# Patient Record
Sex: MALE | Race: WHITE | NOT HISPANIC OR LATINO
[De-identification: names, ages, dates, MRNs, and addresses within clinical notes are randomized per-mention and may not be internally consistent; named-entity substitution may affect disease eponyms.]

---

## 2018-12-07 PROBLEM — Z00.00 ENCOUNTER FOR PREVENTIVE HEALTH EXAMINATION: Status: ACTIVE | Noted: 2018-12-07

## 2018-12-11 ENCOUNTER — APPOINTMENT (OUTPATIENT)
Dept: ORTHOPEDIC SURGERY | Facility: CLINIC | Age: 73
End: 2018-12-11

## 2019-02-28 ENCOUNTER — INPATIENT (INPATIENT)
Facility: HOSPITAL | Age: 74
LOS: 0 days | Discharge: HOME HEALTH | DRG: 176 | End: 2019-03-01
Attending: INTERNAL MEDICINE | Admitting: INTERNAL MEDICINE
Payer: COMMERCIAL

## 2019-02-28 VITALS
SYSTOLIC BLOOD PRESSURE: 108 MMHG | RESPIRATION RATE: 16 BRPM | DIASTOLIC BLOOD PRESSURE: 64 MMHG | OXYGEN SATURATION: 100 % | HEART RATE: 53 BPM | TEMPERATURE: 98 F

## 2019-02-28 DIAGNOSIS — E83.51 HYPOCALCEMIA: ICD-10-CM

## 2019-02-28 DIAGNOSIS — R63.8 OTHER SYMPTOMS AND SIGNS CONCERNING FOOD AND FLUID INTAKE: ICD-10-CM

## 2019-02-28 DIAGNOSIS — Z29.9 ENCOUNTER FOR PROPHYLACTIC MEASURES, UNSPECIFIED: ICD-10-CM

## 2019-02-28 DIAGNOSIS — I25.10 ATHEROSCLEROTIC HEART DISEASE OF NATIVE CORONARY ARTERY WITHOUT ANGINA PECTORIS: ICD-10-CM

## 2019-02-28 DIAGNOSIS — D61.818 OTHER PANCYTOPENIA: ICD-10-CM

## 2019-02-28 DIAGNOSIS — R00.1 BRADYCARDIA, UNSPECIFIED: ICD-10-CM

## 2019-02-28 DIAGNOSIS — Z98.1 ARTHRODESIS STATUS: Chronic | ICD-10-CM

## 2019-02-28 DIAGNOSIS — C90.00 MULTIPLE MYELOMA NOT HAVING ACHIEVED REMISSION: ICD-10-CM

## 2019-02-28 DIAGNOSIS — I26.99 OTHER PULMONARY EMBOLISM WITHOUT ACUTE COR PULMONALE: ICD-10-CM

## 2019-02-28 DIAGNOSIS — Z91.89 OTHER SPECIFIED PERSONAL RISK FACTORS, NOT ELSEWHERE CLASSIFIED: ICD-10-CM

## 2019-02-28 DIAGNOSIS — R55 SYNCOPE AND COLLAPSE: ICD-10-CM

## 2019-02-28 LAB
ANION GAP SERPL CALC-SCNC: 11 MMOL/L — SIGNIFICANT CHANGE UP (ref 5–17)
APTT BLD: 28.1 SEC — SIGNIFICANT CHANGE UP (ref 27.5–36.3)
APTT BLD: 92.1 SEC — HIGH (ref 27.5–36.3)
BASOPHILS # BLD AUTO: 0.02 K/UL — SIGNIFICANT CHANGE UP (ref 0–0.2)
BASOPHILS NFR BLD AUTO: 0.7 % — SIGNIFICANT CHANGE UP (ref 0–2)
BUN SERPL-MCNC: 12 MG/DL — SIGNIFICANT CHANGE UP (ref 7–23)
CALCIUM SERPL-MCNC: 7.1 MG/DL — LOW (ref 8.4–10.5)
CHLORIDE SERPL-SCNC: 106 MMOL/L — SIGNIFICANT CHANGE UP (ref 96–108)
CLOSURE TME COLL+EPINEP BLD: 95 K/UL — LOW (ref 150–400)
CO2 SERPL-SCNC: 23 MMOL/L — SIGNIFICANT CHANGE UP (ref 22–31)
CREAT SERPL-MCNC: 0.82 MG/DL — SIGNIFICANT CHANGE UP (ref 0.5–1.3)
EOSINOPHIL # BLD AUTO: 0.37 K/UL — SIGNIFICANT CHANGE UP (ref 0–0.5)
EOSINOPHIL NFR BLD AUTO: 12.6 % — HIGH (ref 0–6)
GLUCOSE SERPL-MCNC: 146 MG/DL — HIGH (ref 70–99)
HCT VFR BLD CALC: 30.1 % — LOW (ref 39–50)
HGB BLD-MCNC: 9.5 G/DL — LOW (ref 13–17)
IMM GRANULOCYTES NFR BLD AUTO: 0.3 % — SIGNIFICANT CHANGE UP (ref 0–1.5)
INR BLD: 1.24 — HIGH (ref 0.88–1.16)
LYMPHOCYTES # BLD AUTO: 0.43 K/UL — LOW (ref 1–3.3)
LYMPHOCYTES # BLD AUTO: 14.6 % — SIGNIFICANT CHANGE UP (ref 13–44)
MCHC RBC-ENTMCNC: 31.1 PG — SIGNIFICANT CHANGE UP (ref 27–34)
MCHC RBC-ENTMCNC: 31.6 GM/DL — LOW (ref 32–36)
MCV RBC AUTO: 98.7 FL — SIGNIFICANT CHANGE UP (ref 80–100)
MONOCYTES # BLD AUTO: 0.2 K/UL — SIGNIFICANT CHANGE UP (ref 0–0.9)
MONOCYTES NFR BLD AUTO: 6.8 % — SIGNIFICANT CHANGE UP (ref 2–14)
NEUTROPHILS # BLD AUTO: 1.91 K/UL — SIGNIFICANT CHANGE UP (ref 1.8–7.4)
NEUTROPHILS NFR BLD AUTO: 65 % — SIGNIFICANT CHANGE UP (ref 43–77)
NRBC # BLD: 0 /100 WBCS — SIGNIFICANT CHANGE UP (ref 0–0)
NT-PROBNP SERPL-SCNC: 436 PG/ML — HIGH (ref 0–300)
PLATELET # BLD AUTO: 108 K/UL — LOW (ref 150–400)
POTASSIUM SERPL-MCNC: 4.1 MMOL/L — SIGNIFICANT CHANGE UP (ref 3.5–5.3)
POTASSIUM SERPL-SCNC: 4.1 MMOL/L — SIGNIFICANT CHANGE UP (ref 3.5–5.3)
PROTHROM AB SERPL-ACNC: 14.1 SEC — HIGH (ref 10–12.9)
RBC # BLD: 3.05 M/UL — LOW (ref 4.2–5.8)
RBC # FLD: 17 % — HIGH (ref 10.3–14.5)
SODIUM SERPL-SCNC: 140 MMOL/L — SIGNIFICANT CHANGE UP (ref 135–145)
TROPONIN T SERPL-MCNC: 0.03 NG/ML — HIGH (ref 0–0.01)
TROPONIN T SERPL-MCNC: 0.04 NG/ML — CRITICAL HIGH (ref 0–0.01)
WBC # BLD: 2.94 K/UL — LOW (ref 3.8–10.5)
WBC # FLD AUTO: 2.94 K/UL — LOW (ref 3.8–10.5)

## 2019-02-28 PROCEDURE — 71046 X-RAY EXAM CHEST 2 VIEWS: CPT | Mod: 26

## 2019-02-28 PROCEDURE — 99291 CRITICAL CARE FIRST HOUR: CPT

## 2019-02-28 PROCEDURE — 99223 1ST HOSP IP/OBS HIGH 75: CPT | Mod: GC

## 2019-02-28 PROCEDURE — 99232 SBSQ HOSP IP/OBS MODERATE 35: CPT

## 2019-02-28 PROCEDURE — 93971 EXTREMITY STUDY: CPT | Mod: 26,LT

## 2019-02-28 PROCEDURE — 93306 TTE W/DOPPLER COMPLETE: CPT | Mod: 26

## 2019-02-28 PROCEDURE — 78582 LUNG VENTILAT&PERFUS IMAGING: CPT | Mod: 26

## 2019-02-28 RX ORDER — ACYCLOVIR SODIUM 500 MG
400 VIAL (EA) INTRAVENOUS
Qty: 0 | Refills: 0 | Status: DISCONTINUED | OUTPATIENT
Start: 2019-02-28 | End: 2019-03-01

## 2019-02-28 RX ORDER — ACETAMINOPHEN 500 MG
650 TABLET ORAL EVERY 6 HOURS
Qty: 0 | Refills: 0 | Status: DISCONTINUED | OUTPATIENT
Start: 2019-02-28 | End: 2019-03-01

## 2019-02-28 RX ORDER — SODIUM CHLORIDE 9 MG/ML
1000 INJECTION INTRAMUSCULAR; INTRAVENOUS; SUBCUTANEOUS
Qty: 0 | Refills: 0 | Status: DISCONTINUED | OUTPATIENT
Start: 2019-02-28 | End: 2019-02-28

## 2019-02-28 RX ORDER — HEPARIN SODIUM 5000 [USP'U]/ML
7500 INJECTION INTRAVENOUS; SUBCUTANEOUS EVERY 6 HOURS
Qty: 0 | Refills: 0 | Status: DISCONTINUED | OUTPATIENT
Start: 2019-02-28 | End: 2019-02-28

## 2019-02-28 RX ORDER — SODIUM CHLORIDE 9 MG/ML
1000 INJECTION INTRAMUSCULAR; INTRAVENOUS; SUBCUTANEOUS ONCE
Qty: 0 | Refills: 0 | Status: COMPLETED | OUTPATIENT
Start: 2019-02-28 | End: 2019-02-28

## 2019-02-28 RX ORDER — ATORVASTATIN CALCIUM 80 MG/1
20 TABLET, FILM COATED ORAL AT BEDTIME
Qty: 0 | Refills: 0 | Status: DISCONTINUED | OUTPATIENT
Start: 2019-02-28 | End: 2019-03-01

## 2019-02-28 RX ORDER — HEPARIN SODIUM 5000 [USP'U]/ML
INJECTION INTRAVENOUS; SUBCUTANEOUS
Qty: 25000 | Refills: 0 | Status: DISCONTINUED | OUTPATIENT
Start: 2019-02-28 | End: 2019-03-01

## 2019-02-28 RX ORDER — METOPROLOL TARTRATE 50 MG
12.5 TABLET ORAL DAILY
Qty: 0 | Refills: 0 | Status: DISCONTINUED | OUTPATIENT
Start: 2019-03-01 | End: 2019-03-01

## 2019-02-28 RX ORDER — CALCIUM GLUCONATE 100 MG/ML
1 VIAL (ML) INTRAVENOUS ONCE
Qty: 0 | Refills: 0 | Status: COMPLETED | OUTPATIENT
Start: 2019-02-28 | End: 2019-02-28

## 2019-02-28 RX ORDER — ISOSORBIDE MONONITRATE 60 MG/1
60 TABLET, EXTENDED RELEASE ORAL EVERY 24 HOURS
Qty: 0 | Refills: 0 | Status: DISCONTINUED | OUTPATIENT
Start: 2019-03-01 | End: 2019-03-01

## 2019-02-28 RX ORDER — LENALIDOMIDE 5 MG/1
25 CAPSULE ORAL DAILY
Qty: 0 | Refills: 0 | Status: DISCONTINUED | OUTPATIENT
Start: 2019-03-01 | End: 2019-03-01

## 2019-02-28 RX ORDER — HEPARIN SODIUM 5000 [USP'U]/ML
3500 INJECTION INTRAVENOUS; SUBCUTANEOUS EVERY 6 HOURS
Qty: 0 | Refills: 0 | Status: DISCONTINUED | OUTPATIENT
Start: 2019-02-28 | End: 2019-02-28

## 2019-02-28 RX ORDER — HEPARIN SODIUM 5000 [USP'U]/ML
7500 INJECTION INTRAVENOUS; SUBCUTANEOUS ONCE
Qty: 0 | Refills: 0 | Status: COMPLETED | OUTPATIENT
Start: 2019-02-28 | End: 2019-02-28

## 2019-02-28 RX ORDER — POLYETHYLENE GLYCOL 3350 17 G/17G
17 POWDER, FOR SOLUTION ORAL DAILY
Qty: 0 | Refills: 0 | Status: DISCONTINUED | OUTPATIENT
Start: 2019-02-28 | End: 2019-03-01

## 2019-02-28 RX ORDER — SIMETHICONE 80 MG/1
80 TABLET, CHEWABLE ORAL EVERY 8 HOURS
Qty: 0 | Refills: 0 | Status: DISCONTINUED | OUTPATIENT
Start: 2019-02-28 | End: 2019-03-01

## 2019-02-28 RX ORDER — SENNA PLUS 8.6 MG/1
2 TABLET ORAL AT BEDTIME
Qty: 0 | Refills: 0 | Status: DISCONTINUED | OUTPATIENT
Start: 2019-02-28 | End: 2019-03-01

## 2019-02-28 RX ADMIN — Medication 200 GRAM(S): at 23:29

## 2019-02-28 RX ADMIN — HEPARIN SODIUM 1700 UNIT(S)/HR: 5000 INJECTION INTRAVENOUS; SUBCUTANEOUS at 18:05

## 2019-02-28 RX ADMIN — HEPARIN SODIUM 7500 UNIT(S): 5000 INJECTION INTRAVENOUS; SUBCUTANEOUS at 18:06

## 2019-02-28 RX ADMIN — Medication 400 MILLIGRAM(S): at 23:30

## 2019-02-28 RX ADMIN — ATORVASTATIN CALCIUM 20 MILLIGRAM(S): 80 TABLET, FILM COATED ORAL at 23:30

## 2019-02-28 RX ADMIN — SODIUM CHLORIDE 125 MILLILITER(S): 9 INJECTION INTRAMUSCULAR; INTRAVENOUS; SUBCUTANEOUS at 18:37

## 2019-02-28 RX ADMIN — SODIUM CHLORIDE 2000 MILLILITER(S): 9 INJECTION INTRAMUSCULAR; INTRAVENOUS; SUBCUTANEOUS at 14:48

## 2019-02-28 RX ADMIN — SENNA PLUS 2 TABLET(S): 8.6 TABLET ORAL at 23:30

## 2019-02-28 NOTE — H&P ADULT - NSHPLABSRESULTS_GEN_ALL_CORE
9.5    2.94  )-----------( 108      ( 28 Feb 2019 14:43 )             30.1       LIVER FUNCTIONS - ( 28 Feb 2019 14:43 )  Alb: 3.3 g/dL / Pro: 6.6 g/dL / ALK PHOS: 72 U/L / ALT: 13 U/L / AST: 19 U/L / GGT: x           All imaging reviewed.

## 2019-02-28 NOTE — CONSULT NOTE ADULT - ATTENDING COMMENTS
I have evaluated this 73M with a history of recently diagnosed multiple myeloma on CT/RT (last chemotherapy today), pathologic lumbar spine fracture  s/p vertebroplasty and spinal fusion  two mos ago.  H/O CAD, former tobacco use, vasovagal syncope in the past when he sees blood, and nephrolithiasis presenting from the office of his oncologist after syncope during chemo infusion.   The patient had a ventilation/perfusion scan done that showed one segment of the hand many subsegmental some wedge-shaped peripherally-based defects on the perfusion scan. This would be considered high probability of pulmonary emboli.  I evaluated the patient in the emergency department. The basic issue wall is whether the syncope was related to pulmonary embolism. In this case the pulmonary embolism may be considered massive or submassive.  The patient is lying comfortably on the stretcher. He is not complaining of shortness of breath. His SpO2 at rest on room air is 96%. His heart rate is 58 per minute and is regular. (He is not on beta blockers) he has good entry bilaterally with no wheezing or rhonchi. There is no evidence of jugular venous distention. There is no clinical evidence of right heart failure. He has no pedal edema.  A neck Doppler shows no evidence of deep venous thrombi.  My assessment is that patient had a syncopal episode when he saw blood being drawn from other patients in the infusion facility. He stays that he has had a prior reaction in the past. He describes a typical vasovagal attack it is my assessment that the syncope was not related to pulmonary emboli.  Therefore I do not think that the patient needs to be considered for thrombolytic therapy  I discussed management with the pulmonary and the emergency room I agree with starting intravenous heparin and falling the patient closely I have evaluated this 73M with a history of recently diagnosed multiple myeloma on CT/RT (last chemotherapy today), pathologic lumbar spine fracture  s/p vertebroplasty and spinal fusion  two mos ago.  H/O CAD, former tobacco use, vasovagal syncope in the past when he sees blood, and nephrolithiasis presenting from the office of his oncologist after syncope during chemo infusion.   The patient had a ventilation/perfusion scan done that showed one segmental and many subsegmental some wedge-shaped peripherally-based defects on the perfusion scan. This would be considered high probability for pulmonary emboli.  I evaluated the patient in the emergency department. The basic issue is whether the syncope was related to pulmonary embolism. In that case the pulmonary embolism may be considered massive or submassive.  The patient is lying comfortably on the stretcher. He is not complaining of shortness of breath. His SpO2 at rest on room air is 96%. His heart rate is 58 per minute and is regular. (He is not on beta blockers). He has good entry bilaterally with no wheezing or rhonchi. There is no evidence of jugular venous distention. There is no clinical evidence of right heart failure. He has no pedal edema. MP score of 4  A leg Doppler shows no evidence of deep venous thrombi.  My assessment is that patient had a syncopal episode when he saw blood being drawn from other patients in the infusion facility. He stays that he has had a prior similar reaction in the past. He describes a typical vasovagal attack. It is my assessment that the syncope was not related to pulmonary emboli.  Therefore I do not think that the patient needs to be considered for thrombolytic therapy  I discussed management with the pulmonary and the emergency room I agree with starting intravenous heparin and observing the patient closely

## 2019-02-28 NOTE — H&P ADULT - PROBLEM SELECTOR PROBLEM 2
Pulmonary embolism without acute cor pulmonale, unspecified chronicity, unspecified pulmonary embolism type

## 2019-02-28 NOTE — ED PROVIDER NOTE - PROGRESS NOTE DETAILS
Pt discussed w Dr Reece - pt w occluded lad w collaterals, 75% distal circ occlusionhe agrees w plan and recommends admit on tele overnight if eval neg 2/2 h/o cad; he requests we use the cardiology hospitalist [facility] [place]. Pt discussed w Dr Reece - pt w occluded lad w collaterals, 75% distal circ occlusion; he agrees w plan and recommends admit on tele overnight if eval neg 2/2 h/o cad; he requests we use [place] cardiology hospital[place] [facility]. Pt w + vq per verbal report by Dr Guidry.  Cardiology consulted for echo, pulm consulted and Dr Leonard text messaged to activate pert team.  Per Dr Reece - pt had his spine surgery 12/18-19, 2018 at [facility] [place] [place].  Ortho spine paged to discuss safety of heparin after his spinal surgery. Per ortho, pt safe for ac.  Heparin ordered. Pt discussed w Dr Arteaga ([place] coverage) - pt tba to hospitalist svc and he will see pt tomorrow.  Per cardiology, pt w/o R heart strain on echo.  Repeat trop pending.  Pulm paged to discuss dispo. Pt discussed w Dr Arteaga ([place] coverage) - pt tba to hospitalist svc and he will see pt tomorrow.  Per cardiology, pt w/o R heart strain on echo.  Repeat trop pending.  Pulm paged to discuss dispo - they are coming to eval pt; pt signed out to Dr Zapien pending pulm eval and 2nd trop.

## 2019-02-28 NOTE — ED ADULT TRIAGE NOTE - CHIEF COMPLAINT QUOTE
pt received chemo injection & shortly after had syncopal episode. pt states " I felt dizzy before I passed out. " pt receiving chemo & radiation for spinal ca.

## 2019-02-28 NOTE — H&P ADULT - PROBLEM SELECTOR PLAN 5
-chronic, stable. Denies CP.  -C/w toprol, reduce dose to 12.5 mg qd  -c/w lipitor  -hold ASA while on heparin gtt  -c/w imdur

## 2019-02-28 NOTE — ED PROVIDER NOTE - SHIFT CHANGE DETAILS
sign out from  director with pe on vq scan.  hemodynamically stable with normal bedside echo, mild trop elevation.  pending eval by pulmonary team to eval dispo floor vs tele.  >> seen by pulm, rec admit to floor on heparin.

## 2019-02-28 NOTE — ED PROVIDER NOTE - CARE PLAN
Principal Discharge DX:	Pulmonary embolus Principal Discharge DX:	Syncope  Secondary Diagnosis:	Pulmonary embolus

## 2019-02-28 NOTE — ED PROVIDER NOTE - CLINICAL SUMMARY MEDICAL DECISION MAKING FREE TEXT BOX
Pt w h/o vasovagal syncope many yrs ago c/o syncope today; hpi v suggestive of vagal response to injection and watching others get blood draws, however, pt w h/o severe cad and also noted to have lle edema w/o pain/ttp but concerning for dvt in setting of ca; if + dvt, ? pe as etiology of syncope.  Pt also w long qtc - ? arrhythmia.  Plan labs, lle doppler, v/q 2/2 h/o multiple myeloma, trop, cardiac monitor, orthostatics, ivf, reassess.

## 2019-02-28 NOTE — H&P ADULT - HISTORY OF PRESENT ILLNESS
Pt is a 73M with a history of recently diagnosed multiple myeloma on CT/RT (last chemotherapy today), pathologic lumbar spine fx s/p vertebroplasty and spinal fusion (?) two mos ago, CAD, former tobacco use, vasovagal syncope, and nephrolithiasis presenting from the office of his oncologist after syncopizing during CT infusion. He says he recalls feeling "queezy" for about one minute before passing out. He felt lightheadedness and warm. He believes it was observing others having their blood drawn that made him queezy. Denies CP, palp, sob, HA. He was told he passed out for a few seconds and when he came to he was back to baseline. No shaking, incontinence, or tongue biting. No personal hx of seizure. He was not confused when he woke up. Denies numbness/tingling, focal weakness, blurry/double vision, hearing loss. Denies recent illness including F/C, cough, congestion, sore throat. Reports a history of vasovagal syncope twenty years ago. His daughter has episodes of vasovagal syncope at least three times per year when something makes her very anxious. No family hx early or sudden cardiac death. Denies heavy etoh and drug use. Denies history of DVT/PE. Denies recent travel. Does admit to being pretty sedentary since back surgery. Reports being diagnosed with CAD prior to back operation with diagnostic cath showing total LAD occlusion supplied by collaterals and 75% L Cx occlusion. No stents placed. Denies CHF. Denies orthopnea. Had not noticed his LLE to be slightly edematous until ED provider noticed it today. Denies calf pain. Does not know the name of his CT. Last RT was on Monday. He is aware of his pancytopenia, which his oncologist is following. Denies bleeding and dark stool.    In the ED, HR 53, otherwise VSS. Labs notable for WBC 2.9 (12% eos), hgb 9.5 (MCV 98), plt 108k. Trop 0.04, . EKG sinus bradycardia with prolonged QTc (522) and isolated TWI in aVL and V1. VQ scan showed defect in RML territory as well as at least 1-2 other small, bilateral defects consistent with PE. LLE U/S revealed no DVT. He was given 1L NS and hep IVP -> gtt.

## 2019-02-28 NOTE — H&P ADULT - ATTENDING COMMENTS
patient seen and examined    reviewed data including:  labs, available radiological reports/ studies, ekg    agree w/ PE findings as above, except LLExt appeared slightly larger than RLEXT     1. syncope: resolved, occuring during CTX session; appears vasovagal, however found to  have acute PE; follow up ECHO, agree w/ reducing toprol dose given bradycardia.   2. acute PE: on heparin gtt overnight, transition to PO NOAC in AM; followup pulm recs.   3. MM: on CTX, monitor CBC/ BMP ; followup heme/ onc recs  4. monitor QTc, replete lytes prn     rest of plan as above

## 2019-02-28 NOTE — H&P ADULT - PROBLEM SELECTOR PLAN 10
Contacted on Admission: (Y/N) --> Name & Phone #:   2) Date of Contact with PCP:   3) PCP Contacted at Discharge: (Y/N, N/A): N/A  4) Summary of Handoff Given to PCP: N/A  5) Post-Discharge Appointment Date and Location: [facility] Contacted on Admission: (N) --> Name & Phone #: Dr. Nieves   2) Date of Contact with PCP:   3) PCP Contacted at Discharge: (Y/N, N/A): N/A  4) Summary of Handoff Given to PCP: N/A  5) Post-Discharge Appointment Date and Location: [facility]

## 2019-02-28 NOTE — CONSULT NOTE ADULT - PROBLEM SELECTOR RECOMMENDATION 9
-B/L acute pulmonary emboli found on VQ scan. Patients has risk factors in that he has active malignancy.   -LE dopplers negative for DVT. Stat echo done by cardiology at bedside does not show any evidence of right heart strain.   -Cardiac enzymes minimally elevated with absence of RH strain on echo.  -There is no clinical indication for catheter directed thrombolysis.   -PESI score 112, 4-11% estimated 30 day mortality based on this, patient will require close follow up as an out patient.     Recommend:  -Would recommend heparin gtt for now, would transition to an oral agent tomorrow if patient otherwise stable. Lovenox is ideal in malignancy however this patient has vasovagal response to injections so this would not likely work for him also the patient is at risk for renal insufficiency with his MM.   -Oral edoxaban is an option and recent studies suggest that rivaroxaban and apixiban work well for thromboembolic disease in malignancy. The patient follows closely with an oncologist at [facility] who may have a particular preference.   -Would recommend the patient ambulate tomorrow with supervision and have O2 sat monitored to ensure patient does not require supplemental O2 with exertion and that hemodynamics remain stable.   -Patient can follow up with Dr. Joe in the pulmonary clinic upon discharge.   -Please call back with any further questions.

## 2019-02-28 NOTE — H&P ADULT - PROBLEM SELECTOR PLAN 2
-VQ scan c/w multiple b/l PE  -LLE doppler neg. f/u RLE doppler  -c/w heparin drip. Transition to PO A/C tomorrow as per pulm  -monitor O2 sat with ambulation  -PTT Q6 -VQ scan c/w multiple b/l PE. unclear if acute as denies CP, palpitations, SOB. syncope more likely 2/2 vasovagal syncope. Possibly provoked by recent hospitalization vs active malignancy.   -LLE doppler neg. f/u RLE doppler  -c/w heparin drip. Transition to PO A/C tomorrow as per pulm  -monitor O2 sat with ambulation  -PTT Q6

## 2019-02-28 NOTE — H&P ADULT - PROBLEM SELECTOR PLAN 1
-likely vasovagal syncope. reports prodrome of lightheadedness after observing blood draw and getting   -Less likely 2/2 to PE, which shows no e/o heart strain on echo (per cardiology).   -EKG sinus bradycardia without heart block. Isolated TWI. Trop peaked at 0.04. HR now in the 60s. Reduce Toprol XL.  -no e/o seizure  -no FND. CTH deferred.  -orthostatic negative -likely vasovagal syncope. reports prodrome of lightheadedness after observing blood draw and getting CT injection  -Less likely 2/2 to PE, which shows no e/o heart strain on echo (per cardiology).   -EKG sinus bradycardia (50s) without heart block. Isolated TWI. Trop peaked at 0.04. HR now in the 60s. Reduce Toprol XL. repeat EKG in am (QTc prolonged to 525 but stable).   -no e/o seizure  -no FND. CTH deferred.  -orthostatic negative

## 2019-02-28 NOTE — H&P ADULT - NSHPSOCIALHISTORY_GEN_ALL_CORE
tobacco- 20 pack year history quitting 32 years ago  etoh- social  drugs- denies  ambulates without assistance at baseline

## 2019-02-28 NOTE — H&P ADULT - PROBLEM SELECTOR PLAN 3
-resolved. Likely 2/2 toprol XL. Reduce Toprol to 12.5 mg qd  -no acute ischemic changes on EKG  -TSH -resolved. Likely 2/2 toprol XL. Reduce Toprol to 12.5 mg qd  -no acute ischemic changes on EKG  -TSH    #prolonged QTc  -Stable on repeat EKG. 522 -> 525. Repeat in am.   -Mg WNL. Repleted Ca.

## 2019-02-28 NOTE — H&P ADULT - NSHPPHYSICALEXAM_GEN_ALL_CORE
General:  NAD, nontoxic appearing, WDWN, elderly, slightly pale  HENT:  EOMI, PERRL.  No sinus tenderness.  oropharynx WNL.  MM slightly dry  Neck:  Trachea midline.  No JVD, LAD, or thyromegaly.  Heart:  S1S2 no M/R/G, rrr  Lungs:  CTAB no wheezing, rhonchi or rales.  No accessory muscle use.  No respiratory distress.  Abdomen:  NABS.  soft, nontender, mildly distended.  no guarding.  no ascites.  no organomegaly.  Vascular:  Peripheral pulses palpable  Extremities:  1+ pitting edema LLE below the knee. Multiple erythematous (nonpurulent) excorations on ankles b/l.  Back:  No CVA tenderness. Healing linear back scar with mild erythema but no warmth or purulence.  Neuro:  AOx3, no facial asymmetry, nonfocal, no slurred speech. Strength 5/5 throughout. Sensation to light touch intact throughout. CN II-XII grossly intact. Gait deferred.  Skin:  excoriations on ankles

## 2019-02-28 NOTE — ED ADULT NURSE NOTE - OBJECTIVE STATEMENT
73y male AOx4, c/o of syncope after receiving chemo tx at 12:00pm. Patient states "I passed out for 15 seconds after I received my chemotherapy medication." Patient states to be dizzy, no fall, no NVD, NAD, no cp, no sob, no fever, no aches. Patient stated to have finished spinal surgery 3 weeks ago. Pt denies hitting head, no blood thinners. Pt denies any symptoms at this time.

## 2019-02-28 NOTE — H&P ADULT - ASSESSMENT
73M with a history of recently diagnosed multiple myeloma on CT/RT (last chemotherapy today), pathologic lumbar spine fx s/p vertebroplasty and spinal fusion (?) two mos ago, CAD, former tobacco use, vasovagal syncope, and nephrolithiasis admitted with syncope and acute PE.

## 2019-02-28 NOTE — ED ADULT NURSE NOTE - CHPI ED NUR SYMPTOMS NEG
no congestion/no diaphoresis/no dizziness/no chills/no nausea/no chest pain/no fever/no shortness of breath/no vomiting

## 2019-02-28 NOTE — H&P ADULT - PROBLEM SELECTOR PLAN 4
-Likely 2/2 CT  -f/u iron studies, B12, folate, HIV, HCV  -Trend CBC  -T&S  -no e/o bleeding -Likely 2/2 CT  -f/u iron studies, B12, folate, HIV, HCV  -Trend CBC  -T&S  -no e/o bleeding    #prolonged QTc  -Stable on repeat EKG. 522 -> 525. Repeat in am.   -Mg WNL. Repleted Ca.

## 2019-02-28 NOTE — H&P ADULT - FAMILY HISTORY
Child  Still living? Unknown  Family history of syncope, Age at diagnosis: Age Unknown Child  Still living? Unknown  Family history of syncope, Age at diagnosis: Age Unknown     Mother  Still living? Unknown  Family history of breast cancer, Age at diagnosis: Age Unknown     Father  Still living? Unknown  Family history of death of natural cause, Age at diagnosis: Age Unknown

## 2019-03-01 ENCOUNTER — TRANSCRIPTION ENCOUNTER (OUTPATIENT)
Age: 74
End: 2019-03-01

## 2019-03-01 VITALS
HEART RATE: 64 BPM | OXYGEN SATURATION: 99 % | RESPIRATION RATE: 16 BRPM | SYSTOLIC BLOOD PRESSURE: 143 MMHG | DIASTOLIC BLOOD PRESSURE: 63 MMHG | TEMPERATURE: 97 F

## 2019-03-01 LAB
ANION GAP SERPL CALC-SCNC: 8 MMOL/L — SIGNIFICANT CHANGE UP (ref 5–17)
APPEARANCE UR: CLEAR — SIGNIFICANT CHANGE UP
APTT BLD: 87.5 SEC — HIGH (ref 27.5–36.3)
BILIRUB UR-MCNC: NEGATIVE — SIGNIFICANT CHANGE UP
BLD GP AB SCN SERPL QL: NEGATIVE — SIGNIFICANT CHANGE UP
BUN SERPL-MCNC: 10 MG/DL — SIGNIFICANT CHANGE UP (ref 7–23)
CALCIUM SERPL-MCNC: 6.6 MG/DL — LOW (ref 8.4–10.5)
CALCIUM SERPL-MCNC: 6.9 MG/DL — LOW (ref 8.4–10.5)
CHLORIDE SERPL-SCNC: 108 MMOL/L — SIGNIFICANT CHANGE UP (ref 96–108)
CO2 SERPL-SCNC: 24 MMOL/L — SIGNIFICANT CHANGE UP (ref 22–31)
COLOR SPEC: YELLOW — SIGNIFICANT CHANGE UP
CREAT SERPL-MCNC: 0.8 MG/DL — SIGNIFICANT CHANGE UP (ref 0.5–1.3)
DIFF PNL FLD: NEGATIVE — SIGNIFICANT CHANGE UP
FERRITIN SERPL-MCNC: 167 NG/ML — SIGNIFICANT CHANGE UP (ref 30–400)
GLUCOSE SERPL-MCNC: 82 MG/DL — SIGNIFICANT CHANGE UP (ref 70–99)
GLUCOSE UR QL: NEGATIVE — SIGNIFICANT CHANGE UP
HCT VFR BLD CALC: 29.2 % — LOW (ref 39–50)
HCV AB S/CO SERPL IA: 0.04 S/CO — SIGNIFICANT CHANGE UP
HCV AB SERPL-IMP: SIGNIFICANT CHANGE UP
HGB BLD-MCNC: 9 G/DL — LOW (ref 13–17)
HIV 1+2 AB+HIV1 P24 AG SERPL QL IA: SIGNIFICANT CHANGE UP
INR BLD: 1.24 — HIGH (ref 0.88–1.16)
IRON SATN MFR SERPL: 15 % — LOW (ref 16–55)
IRON SATN MFR SERPL: 30 UG/DL — LOW (ref 45–165)
KETONES UR-MCNC: NEGATIVE — SIGNIFICANT CHANGE UP
LEUKOCYTE ESTERASE UR-ACNC: NEGATIVE — SIGNIFICANT CHANGE UP
MAGNESIUM SERPL-MCNC: 2.1 MG/DL — SIGNIFICANT CHANGE UP (ref 1.6–2.6)
MCHC RBC-ENTMCNC: 30.2 PG — SIGNIFICANT CHANGE UP (ref 27–34)
MCHC RBC-ENTMCNC: 30.8 GM/DL — LOW (ref 32–36)
MCV RBC AUTO: 98 FL — SIGNIFICANT CHANGE UP (ref 80–100)
NITRITE UR-MCNC: NEGATIVE — SIGNIFICANT CHANGE UP
NRBC # BLD: 0 /100 WBCS — SIGNIFICANT CHANGE UP (ref 0–0)
PCP SPEC-MCNC: SIGNIFICANT CHANGE UP
PH UR: 6 — SIGNIFICANT CHANGE UP (ref 5–8)
PLATELET # BLD AUTO: 111 K/UL — LOW (ref 150–400)
POTASSIUM SERPL-MCNC: 3.5 MMOL/L — SIGNIFICANT CHANGE UP (ref 3.5–5.3)
POTASSIUM SERPL-SCNC: 3.5 MMOL/L — SIGNIFICANT CHANGE UP (ref 3.5–5.3)
PROT UR-MCNC: NEGATIVE MG/DL — SIGNIFICANT CHANGE UP
PROTHROM AB SERPL-ACNC: 14.1 SEC — HIGH (ref 10–12.9)
PTH-INTACT FLD-MCNC: 161 PG/ML — HIGH (ref 15–65)
RBC # BLD: 2.98 M/UL — LOW (ref 4.2–5.8)
RBC # FLD: 17.2 % — HIGH (ref 10.3–14.5)
RH IG SCN BLD-IMP: POSITIVE — SIGNIFICANT CHANGE UP
SODIUM SERPL-SCNC: 140 MMOL/L — SIGNIFICANT CHANGE UP (ref 135–145)
SP GR SPEC: 1.01 — SIGNIFICANT CHANGE UP (ref 1–1.03)
TIBC SERPL-MCNC: 202 UG/DL — LOW (ref 220–430)
TRANSFERRIN SERPL-MCNC: 174 MG/DL — LOW (ref 200–360)
UIBC SERPL-MCNC: 172 UG/DL — SIGNIFICANT CHANGE UP (ref 110–370)
UROBILINOGEN FLD QL: 0.2 E.U./DL — SIGNIFICANT CHANGE UP
WBC # BLD: 2.55 K/UL — LOW (ref 3.8–10.5)
WBC # FLD AUTO: 2.55 K/UL — LOW (ref 3.8–10.5)

## 2019-03-01 PROCEDURE — 85027 COMPLETE CBC AUTOMATED: CPT

## 2019-03-01 PROCEDURE — 86803 HEPATITIS C AB TEST: CPT

## 2019-03-01 PROCEDURE — 78582 LUNG VENTILAT&PERFUS IMAGING: CPT

## 2019-03-01 PROCEDURE — 96374 THER/PROPH/DIAG INJ IV PUSH: CPT

## 2019-03-01 PROCEDURE — 83550 IRON BINDING TEST: CPT

## 2019-03-01 PROCEDURE — 36415 COLL VENOUS BLD VENIPUNCTURE: CPT

## 2019-03-01 PROCEDURE — 83735 ASSAY OF MAGNESIUM: CPT

## 2019-03-01 PROCEDURE — 83970 ASSAY OF PARATHORMONE: CPT

## 2019-03-01 PROCEDURE — 80076 HEPATIC FUNCTION PANEL: CPT

## 2019-03-01 PROCEDURE — 84466 ASSAY OF TRANSFERRIN: CPT

## 2019-03-01 PROCEDURE — 99285 EMERGENCY DEPT VISIT HI MDM: CPT | Mod: 25

## 2019-03-01 PROCEDURE — 93306 TTE W/DOPPLER COMPLETE: CPT

## 2019-03-01 PROCEDURE — 87389 HIV-1 AG W/HIV-1&-2 AB AG IA: CPT

## 2019-03-01 PROCEDURE — 85379 FIBRIN DEGRADATION QUANT: CPT

## 2019-03-01 PROCEDURE — 99239 HOSP IP/OBS DSCHRG MGMT >30: CPT

## 2019-03-01 PROCEDURE — A9540: CPT

## 2019-03-01 PROCEDURE — 82728 ASSAY OF FERRITIN: CPT

## 2019-03-01 PROCEDURE — 85025 COMPLETE CBC W/AUTO DIFF WBC: CPT

## 2019-03-01 PROCEDURE — 83540 ASSAY OF IRON: CPT

## 2019-03-01 PROCEDURE — 86850 RBC ANTIBODY SCREEN: CPT

## 2019-03-01 PROCEDURE — 82607 VITAMIN B-12: CPT

## 2019-03-01 PROCEDURE — 85610 PROTHROMBIN TIME: CPT

## 2019-03-01 PROCEDURE — 85045 AUTOMATED RETICULOCYTE COUNT: CPT

## 2019-03-01 PROCEDURE — 86900 BLOOD TYPING SEROLOGIC ABO: CPT

## 2019-03-01 PROCEDURE — 83880 ASSAY OF NATRIURETIC PEPTIDE: CPT

## 2019-03-01 PROCEDURE — 84443 ASSAY THYROID STIM HORMONE: CPT

## 2019-03-01 PROCEDURE — 93971 EXTREMITY STUDY: CPT

## 2019-03-01 PROCEDURE — 82310 ASSAY OF CALCIUM: CPT

## 2019-03-01 PROCEDURE — 82962 GLUCOSE BLOOD TEST: CPT

## 2019-03-01 PROCEDURE — 81003 URINALYSIS AUTO W/O SCOPE: CPT

## 2019-03-01 PROCEDURE — 71046 X-RAY EXAM CHEST 2 VIEWS: CPT

## 2019-03-01 PROCEDURE — 86901 BLOOD TYPING SEROLOGIC RH(D): CPT

## 2019-03-01 PROCEDURE — A9567: CPT

## 2019-03-01 PROCEDURE — 82746 ASSAY OF FOLIC ACID SERUM: CPT

## 2019-03-01 PROCEDURE — 84484 ASSAY OF TROPONIN QUANT: CPT

## 2019-03-01 PROCEDURE — 85730 THROMBOPLASTIN TIME PARTIAL: CPT

## 2019-03-01 PROCEDURE — 80307 DRUG TEST PRSMV CHEM ANLYZR: CPT

## 2019-03-01 PROCEDURE — 80048 BASIC METABOLIC PNL TOTAL CA: CPT

## 2019-03-01 RX ORDER — SENNA PLUS 8.6 MG/1
17 TABLET ORAL
Qty: 0 | Refills: 0 | COMMUNITY

## 2019-03-01 RX ORDER — POTASSIUM CHLORIDE 20 MEQ
40 PACKET (EA) ORAL ONCE
Qty: 0 | Refills: 0 | Status: COMPLETED | OUTPATIENT
Start: 2019-03-01 | End: 2019-03-01

## 2019-03-01 RX ORDER — ATORVASTATIN CALCIUM 80 MG/1
1 TABLET, FILM COATED ORAL
Qty: 0 | Refills: 0 | COMMUNITY

## 2019-03-01 RX ORDER — APIXABAN 2.5 MG/1
10 TABLET, FILM COATED ORAL EVERY 12 HOURS
Qty: 0 | Refills: 0 | Status: DISCONTINUED | OUTPATIENT
Start: 2019-03-01 | End: 2019-03-01

## 2019-03-01 RX ORDER — SIMETHICONE 80 MG/1
1 TABLET, CHEWABLE ORAL
Qty: 0 | Refills: 0 | COMMUNITY

## 2019-03-01 RX ORDER — ISOSORBIDE MONONITRATE 60 MG/1
60 TABLET, EXTENDED RELEASE ORAL EVERY 24 HOURS
Qty: 0 | Refills: 0 | Status: DISCONTINUED | OUTPATIENT
Start: 2019-03-01 | End: 2019-03-01

## 2019-03-01 RX ORDER — METOPROLOL TARTRATE 50 MG
1 TABLET ORAL
Qty: 0 | Refills: 0 | COMMUNITY

## 2019-03-01 RX ORDER — APIXABAN 2.5 MG/1
1 TABLET, FILM COATED ORAL
Qty: 60 | Refills: 1 | OUTPATIENT
Start: 2019-03-01 | End: 2019-04-29

## 2019-03-01 RX ORDER — ASPIRIN/CALCIUM CARB/MAGNESIUM 324 MG
1 TABLET ORAL
Qty: 0 | Refills: 0 | COMMUNITY

## 2019-03-01 RX ORDER — OXYCODONE HYDROCHLORIDE 5 MG/1
1 TABLET ORAL
Qty: 0 | Refills: 0 | COMMUNITY

## 2019-03-01 RX ORDER — GABAPENTIN 400 MG/1
0 CAPSULE ORAL
Qty: 0 | Refills: 0 | COMMUNITY

## 2019-03-01 RX ORDER — ACYCLOVIR SODIUM 500 MG
1 VIAL (EA) INTRAVENOUS
Qty: 0 | Refills: 0 | COMMUNITY

## 2019-03-01 RX ORDER — POLYETHYLENE GLYCOL 3350 17 G/17G
1 POWDER, FOR SOLUTION ORAL
Qty: 0 | Refills: 0 | COMMUNITY

## 2019-03-01 RX ORDER — APIXABAN 2.5 MG/1
2 TABLET, FILM COATED ORAL
Qty: 28 | Refills: 0 | OUTPATIENT
Start: 2019-03-01 | End: 2019-03-07

## 2019-03-01 RX ORDER — MAGNESIUM SULFATE 500 MG/ML
1 VIAL (ML) INJECTION ONCE
Qty: 0 | Refills: 0 | Status: COMPLETED | OUTPATIENT
Start: 2019-03-01 | End: 2019-03-01

## 2019-03-01 RX ORDER — ISOSORBIDE MONONITRATE 60 MG/1
1 TABLET, EXTENDED RELEASE ORAL
Qty: 0 | Refills: 0 | COMMUNITY

## 2019-03-01 RX ORDER — LENALIDOMIDE 5 MG/1
1 CAPSULE ORAL
Qty: 0 | Refills: 0 | COMMUNITY

## 2019-03-01 RX ADMIN — Medication 100 GRAM(S): at 03:01

## 2019-03-01 RX ADMIN — Medication 40 MILLIEQUIVALENT(S): at 08:11

## 2019-03-01 RX ADMIN — Medication 400 MILLIGRAM(S): at 06:44

## 2019-03-01 RX ADMIN — ISOSORBIDE MONONITRATE 60 MILLIGRAM(S): 60 TABLET, EXTENDED RELEASE ORAL at 06:44

## 2019-03-01 NOTE — DISCHARGE NOTE NURSING/CASE MANAGEMENT/SOCIAL WORK - NSDCDPATPORTLINK_GEN_ALL_CORE
You can access the [facility] Patient Portal, offered by [facility], by registering with the following website: [email or web address]

## 2019-03-01 NOTE — DISCHARGE NOTE PROVIDER - NSDCCPCAREPLAN_GEN_ALL_CORE_FT
PRINCIPAL DISCHARGE DIAGNOSIS  Problem: Pulmonary embolus  Assessment and Plan of Treatment:       SECONDARY DISCHARGE DIAGNOSES  Problem: Multiple myeloma not having achieved remission  Assessment and Plan of Treatment: Multiple myeloma not having achieved remission PRINCIPAL DISCHARGE DIAGNOSIS  Problem: Pulmonary embolus  Assessment and Plan of Treatment: You were admitted into the hospital for evaluation for syncope. You were found to have a pulmonary embolism on a special imaging on V/Q scan. You were started on heparin drip to help dissolve any clots in your lung. You will be started on eliquis 10mg two times a day for 7 days then 5mg two times a day thereafter. You are stable for discharge.        SECONDARY DISCHARGE DIAGNOSES  Problem: Multiple myeloma not having achieved remission  Assessment and Plan of Treatment: Please follow up with Dr. Ruiz for further treatment of your multiple myeloma

## 2019-03-01 NOTE — DISCHARGE NOTE PROVIDER - CARE PROVIDER_API CALL
Lj Nieves)  Hematology; Internal Medicine; Oncology  [address], [address]  [place] [address]  Phone: [phone]  Fax: [phone]  Follow Up Time:

## 2019-03-01 NOTE — DISCHARGE NOTE PROVIDER - NSDCHC_MEDRECSTATUS_GEN_ALL_CORE
Admission Reconciliation is Completed  Discharge Reconciliation is Not Complete Admission Reconciliation is Not Complete  Discharge Reconciliation is Not Complete Admission Reconciliation is Completed  Discharge Reconciliation is Completed

## 2019-03-01 NOTE — DISCHARGE NOTE PROVIDER - HOSPITAL COURSE
73M with recently diagnosed multiple myeloma on CT/RT (last chemotherapy yesterday), pathologic lumbar spine fx s/p vertebroplasty two months ago, CAD, previous vasovagal episodes admitted for syncope during chemotherapy treatment. Patient felt queasy, lightheaded and warm a minute prior to passing out, which lasted for a few seconds while seated. Returned to baseline without postictal state, no focal neurological deficits, history of seizures or seizure like activity. Denied any palpitations, recent illnesses, chest pain or calf pain/edema. Currently pancytopenic due to treatments but denied any fevers, bleeding or dark stools. Patient felt this episode was related to his observing blood draws. Had previous episodes of vasovagal syncope that he felt were similar, occurring during medical procedures.        In ED HR was 53 without tachypnea, VSS, hgb 9.5, ANC 1911. ED physician noted increased edema in left leg, ordered V/Q scan which showed defect in RML and 1-2 other small, b/l defects consistent with PE. LLE U/S revealed no DVT, echo showed no right heart strain, troponins and BNP showed no acute cardiac ischemia. Started on IV heparin. EKG showed sinus bradycardia with same on repeat. Decreased toprolol to 12.5mg. On floors, patient was hemodynamically stable with normal vital sign  and continued to deny chest pain, SOB, calf pain, fevers or palpitations. aPTT was at therapeutic levels, had mild normocytic anemia, iron workup done with no transfusion required. Seen by pulmonology and will be followed by them in outpatient with Dr. Joe in pulm clinic. Also seen by heme/onc and recommended transition to Eliquis 10 BID for 7 days, then continue 5 mg BID. Patient will be discharged back to oncologist for continued CT/RT treatment. 73M with recently diagnosed multiple myeloma on CT/RT (last chemotherapy yesterday), pathologic lumbar spine fx s/p vertebroplasty two months ago, CAD, previous vasovagal episodes admitted for syncope during chemotherapy treatment. Patient felt queasy, lightheaded and warm a minute prior to passing out, which lasted for a few seconds while seated. Returned to baseline without postictal state, no focal neurological deficits, history of seizures or seizure like activity. Denied any palpitations, recent illnesses, chest pain or calf pain/edema. Currently pancytopenic due to treatments but denied any fevers, bleeding or dark stools. Patient felt this episode was related to his observing blood draws. Had previous episodes of vasovagal syncope that he felt were similar, occurring during medical procedures.    In ED HR was 53 without tachypnea, VSS, hgb 9.5, ANC 1911. ED physician noted increased edema in left leg, ordered V/Q scan which showed defect in RML and 1-2 other small, b/l defects consistent with PE. LLE U/S revealed no DVT, echo showed no right heart strain, troponins and BNP showed no acute cardiac ischemia. Started on IV heparin. EKG showed sinus bradycardia with same on repeat. Decreased toprolol to 12.5mg. On floors, patient was hemodynamically stable with normal vital sign  and continued to deny chest pain, SOB, calf pain, fevers or palpitations. aPTT was at therapeutic levels, had mild normocytic anemia, iron workup done with no transfusion required. Seen by pulmonology and will be followed by them in outpatient with Dr. Joe in pulm clinic. Also seen by heme/onc and recommended transition to Eliquis 10 BID for 7 days, then continue 5 mg BID. Patient will be discharged back to oncologist Dr. Nieves for continued CT/RT treatment.

## 2019-03-01 NOTE — CONSULT NOTE ADULT - SUBJECTIVE AND OBJECTIVE BOX
PERT team consult note      Patient is a 73y old  Male who presents with a chief complaint of syncope, PE.      HPI:  	Pt is a 73M with a history of recently diagnosed multiple myeloma on CT/RT (last chemotherapy today), pathologic lumbar spine fx s/p vertebroplasty and spinal fusion (?) two mos ago, CAD, former tobacco use, vasovagal syncope, and nephrolithiasis presenting from the office of his oncologist after syncopizing during chemo infusion. The patient states that he felt nauseous before the episode as well as lightheadedness and feeling flushed. He believes it was observing others having their blood drawn started off his symptoms. Denies CP, palp, sob, HA. He was told he passed out for a few seconds and when he came to he was back to baseline. No shaking, incontinence, or tongue biting. No personal hx of seizure. He was not confused when he woke up. Denies numbness/tingling, focal weakness, blurry/double vision, hearing loss. Denies recent illness including F/C, cough, congestion, sore throat. Reports a history of vasovagal syncope twenty years ago. Upon presentation to the ED the pt was found to be hemodynamically stable with, satting well on room air, and not in any acute distress, but given hx of malignancy and this recent episode of syncope a VQ scan was done to rule out PE (pt has renal insufficiency from MM) which came back positive for multiple acute PEs b/l. The PERT team was called to evaluate this patient for the need for catheter directed thrombolysis given hx of syncope, new PEs, and mild elevation in troponin and borderline elevation of BNP. The patient denied any SOB, chest pain, palpitations, nausea, vomiting, diarrhea, or abdominal pain and had no acute complaints otherwise.     ROS:  A 14 point ROS was reviewed with the patient and was negative except for what is mentioned above.        PAST MEDICAL & SURGICAL HISTORY:  CAD (coronary artery disease)  Multiple myeloma  H/O spinal fusion      FAMILY HISTORY:  Family history of syncope (Child)      SOCIAL HISTORY:  Smoking Status: Non smoker  Pack Years:    MEDICATIONS:  Pulmonary:    Antimicrobials:  acyclovir   Oral Tab/Cap 400 milliGRAM(s) Oral two times a day    Anticoagulants:  heparin  Infusion.  Unit(s)/Hr IV Continuous <Continuous>    Onc:    GI/:  polyethylene glycol 3350 17 Gram(s) Oral daily  senna 2 Tablet(s) Oral at bedtime  simethicone 80 milliGRAM(s) Chew every 8 hours PRN    Endocrine:  atorvastatin 20 milliGRAM(s) Oral at bedtime    Cardiac:    Other Medications:  acetaminophen   Tablet .. 650 milliGRAM(s) Oral every 6 hours PRN  atorvastatin 20 milliGRAM(s) Oral at bedtime      Allergies    No Known Allergies    Intolerances        Vital Signs Last 24 Hrs  T(C): 36.8 (28 Feb 2019 21:07), Max: 36.8 (28 Feb 2019 21:07)  T(F): 98.2 (28 Feb 2019 21:07), Max: 98.2 (28 Feb 2019 21:07)  HR: 62 (28 Feb 2019 21:07) (53 - 64)  BP: 147/70 (28 Feb 2019 21:07) (108/64 - 151/68)  BP(mean): --  RR: 18 (28 Feb 2019 21:07) (16 - 20)  SpO2: 97% (28 Feb 2019 21:07) (97% - 100%)    General: NAD, AAO x3  HEENT: No icterus,. Moist mucous membranes  Neck: mildJVD noted. Supple, no meningismus  Cardio: S1, S2 noted, RRR. No murmurs, rubs or gallops  Resp: Clear to auscultation b/l. No adventitious sounds  Abdo: Soft, NT, bowel sounds present. No organomegaly  Extremities: No edema noted. Pulses present b/l  Neuro: AAO x3, grossly normal motor strength.  Lymphnodes: no lymphadenopathy identified.  Skin: Dry, no rashes    LABS:      CBC Full  -  ( 28 Feb 2019 14:43 )  WBC Count : 2.94 K/uL  Hemoglobin : 9.5 g/dL  Hematocrit : 30.1 %  Platelet Count - Automated : 108 K/uL  Mean Cell Volume : 98.7 fl  Mean Cell Hemoglobin : 31.1 pg  Mean Cell Hemoglobin Concentration : 31.6 gm/dL  Auto Neutrophil # : 1.91 K/uL  Auto Lymphocyte # : 0.43 K/uL  Auto Monocyte # : 0.20 K/uL  Auto Eosinophil # : 0.37 K/uL  Auto Basophil # : 0.02 K/uL  Auto Neutrophil % : 65.0 %  Auto Lymphocyte % : 14.6 %  Auto Monocyte % : 6.8 %  Auto Eosinophil % : 12.6 %  Auto Basophil % : 0.7 %    02-28    140  |  106  |  12  ----------------------------<  146<H>  4.1   |  23  |  0.82    Ca    7.1<L>      28 Feb 2019 14:43    TPro  6.6  /  Alb  3.3  /  TBili  0.2  /  DBili  <0.2  /  AST  19  /  ALT  13  /  AlkPhos  72  02-28    PT/INR - ( 28 Feb 2019 14:43 )   PT: 14.1 sec;   INR: 1.24          PTT - ( 28 Feb 2019 14:43 )  PTT:28.1 sec                  RADIOLOGY & ADDITIONAL STUDIES (The following images were personally reviewed):  Chest Xray  VQ scan
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 Bone marrow biopsy showed 70% monoclonal kappa plasma cells. IgG 5156, kappa light chain 1098, kappa/lambda ratio of 159. Started on RVD and Xgeva in Dec of 2018. S/p 4 cycles with last dose given on 2/28/19. Yesterday in office had syncopal episode. Pt sent to ED for further eval. On work up noted to have a possible PE on V/Q scan. Echo did not show right heart strain. Heparin started. Today pt feels well and has no major complaints. Ambulating freely at the time of my visit. Denies lightheadedness, chest pain, sob, palpitations, abd pain. US of lower ext negative for DVT.     PMHx:  MM- Dx and treatment as above  Spinal stenosis s/p spinal fustion and RT to lumbar spine.     Social Hx:  no smoking- 20p yrs- quit 30 yrs ago, social EtOH, ambulates without assistance.    Family Hx:  Not pertinent at this time    Meds:  Please see chart    Allergies:  NKDA    Physical exam:  Vital Signs Last 24 Hrs  T(C): 36.3 (01 Mar 2019 08:55), Max: 36.8 (28 Feb 2019 21:07)  T(F): 97.3 (01 Mar 2019 08:55), Max: 98.2 (28 Feb 2019 21:07)  HR: 64 (01 Mar 2019 08:55) (53 - 66)  BP: 143/63 (01 Mar 2019 08:55) (108/64 - 151/68)  BP(mean): --  RR: 16 (01 Mar 2019 08:55) (16 - 20)  SpO2: 99% (01 Mar 2019 08:55) (97% - 100%)    NAD, AAOx3, ambulating   Sclera clear, no conjunctival pallor  No oral lesions  Clear to ausculation bl  Reg rate  +bs, soft, non tender, no distention, no organomegaly  + pulses, equal, no edema    Labs:  Anemia- Hgb 9.0, Cr normal, iron panel with low sat but ferritin normal.   D-dimer high.    V/Q scan:  Possible PE.

## 2019-03-01 NOTE — CONSULT NOTE ADULT - ASSESSMENT
This is a 74 y/o man with a hx of MM who is currently receiving chemo/RT who presented to  after what appears to have been an episode of vasovagal syncope at his oncologist office and was subsequently found to bilateral acute PEs on VQ scan.
72 yo M with MM. Admitted after syncopal episode in office. Diagnosed with possible PE on V/Q scan.    1) MM/ plasmacytoma: Dx and treatment so far as per H&P. Follows with Dr. Nieves.   2) Anemia- likely related to MM. No clear iron def on iron panel. Will Cont to monitor in outpt setting. Would recommend stool occult testing now that pt will need anticoagulation.   3) PE- Positive V/Q scan. No right heart strain on ECHO in ED. Still awaiting official read. On heparin now. May transition to Eliquis 10mg bid for 7 days and then 5mg bid thereafter.   4) Pt doing well. May potentially go home today from heme perspective.

## 2019-03-05 DIAGNOSIS — B00.9 HERPESVIRAL INFECTION, UNSPECIFIED: ICD-10-CM

## 2019-03-05 DIAGNOSIS — Z98.890 OTHER SPECIFIED POSTPROCEDURAL STATES: ICD-10-CM

## 2019-03-05 DIAGNOSIS — K59.00 CONSTIPATION, UNSPECIFIED: ICD-10-CM

## 2019-03-05 DIAGNOSIS — E78.5 HYPERLIPIDEMIA, UNSPECIFIED: ICD-10-CM

## 2019-03-05 DIAGNOSIS — Z98.1 ARTHRODESIS STATUS: ICD-10-CM

## 2019-03-05 DIAGNOSIS — Z87.311 PERSONAL HISTORY OF (HEALED) OTHER PATHOLOGICAL FRACTURE: ICD-10-CM

## 2019-03-05 DIAGNOSIS — R00.1 BRADYCARDIA, UNSPECIFIED: ICD-10-CM

## 2019-03-05 DIAGNOSIS — Z79.82 LONG TERM (CURRENT) USE OF ASPIRIN: ICD-10-CM

## 2019-03-05 DIAGNOSIS — R55 SYNCOPE AND COLLAPSE: ICD-10-CM
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
## 2019-03-05 DIAGNOSIS — I45.81 LONG QT SYNDROME: ICD-10-CM

## 2019-03-05 DIAGNOSIS — Z80.3 FAMILY HISTORY OF MALIGNANT NEOPLASM OF BREAST: ICD-10-CM

## 2019-03-05 DIAGNOSIS — Z92.3 PERSONAL HISTORY OF IRRADIATION: ICD-10-CM

## 2019-03-05 DIAGNOSIS — D64.81 ANEMIA DUE TO ANTINEOPLASTIC CHEMOTHERAPY: ICD-10-CM

## 2019-03-05 DIAGNOSIS — D61.818 OTHER PANCYTOPENIA: ICD-10-CM

## 2019-03-05 DIAGNOSIS — I26.99 OTHER PULMONARY EMBOLISM WITHOUT ACUTE COR PULMONALE: ICD-10-CM

## 2019-03-05 DIAGNOSIS — I25.82 CHRONIC TOTAL OCCLUSION OF CORONARY ARTERY: ICD-10-CM

## 2019-03-05 DIAGNOSIS — T44.7X5A ADVERSE EFFECT OF BETA-ADRENORECEPTOR ANTAGONISTS, INITIAL ENCOUNTER: ICD-10-CM

## 2019-03-05 DIAGNOSIS — Z92.21 PERSONAL HISTORY OF ANTINEOPLASTIC CHEMOTHERAPY: ICD-10-CM

## 2019-03-05 DIAGNOSIS — Z87.891 PERSONAL HISTORY OF NICOTINE DEPENDENCE: ICD-10-CM

## 2019-03-05 DIAGNOSIS — Z79.891 LONG TERM (CURRENT) USE OF OPIATE ANALGESIC: ICD-10-CM

## 2019-03-05 DIAGNOSIS — I10 ESSENTIAL (PRIMARY) HYPERTENSION: ICD-10-CM

## 2019-03-05 DIAGNOSIS — N20.0 CALCULUS OF KIDNEY: ICD-10-CM

## 2019-03-05 DIAGNOSIS — Z79.899 OTHER LONG TERM (CURRENT) DRUG THERAPY: ICD-10-CM

## 2019-03-05 DIAGNOSIS — I25.10 ATHEROSCLEROTIC HEART DISEASE OF NATIVE CORONARY ARTERY WITHOUT ANGINA PECTORIS: ICD-10-CM

## 2019-03-05 DIAGNOSIS — E83.51 HYPOCALCEMIA: ICD-10-CM

## 2019-03-05 DIAGNOSIS — C90.00 MULTIPLE MYELOMA NOT HAVING ACHIEVED REMISSION: ICD-10-CM

## 2020-04-16 NOTE — ED ADULT NURSE NOTE - ED CARDIAC RHYTHM
PROCEDURES:  Percutaneous arterial cannulation for monitoring 16-Apr-2020 17:54:51  Sean Fields  Insertion of central venous catheter with ultrasound guidance 16-Apr-2020 17:52:26  Sean Fields PROCEDURES:  Insertion of central venous catheter with ultrasound guidance 16-Apr-2020 17:52:26  Sean Fields regular

## 2020-05-29 NOTE — ED PROVIDER NOTE - MDM ORDERS SUBMITTED SELECTION
EXAM: CHEST 1 VIEW, AP/PA ONLY



INDICATION: Cough.



COMPARISON: None.



FINDINGS: Low lung volumes accentuate the heart size and

pulmonary vascularity. No focal pulmonary opacity, pleural

effusion or pneumothorax. No acute osseous findings.



IMPRESSION: No acute cardiopulmonary findings.



Dictated by: 



  Dictated on workstation # [id number]
PROCEDURE: CT abdomen and pelvis with contrast.



TECHNIQUE: Multiple contiguous axial images were obtained through

the abdomen and pelvis after administration of intravenous

contrast. Auto Exposure Controls were utilized during the CT exam

to meet ALARA standards for radiation dose reduction. 



INDICATION:  Nausea and vomiting.



COMPARISON: None available.



FINDINGS:

There is focal consolidation in the posterior segment of the left

lower lobe. Scattered much smaller areas of consolidation are

demonstrated in the right lower lobe, with possible tree-in-bud

opacities noted in the right middle lobe. There is no pleural

effusion. The visualized heart is normal in size.



There is mild diffuse low attenuation of the hepatic parenchyma,

likely reflecting hepatic steatosis. The gallbladder, spleen,

pancreas and adrenal glands are unremarkable. A benign-appearing

dystrophic calcification is demonstrated in the inferior spleen.



The kidneys are symmetric in size and demonstrate normal

enhancement, without evidence of renal calculus or hydronephrosis

on either side. There is a subcentimeter focus of low-attenuation

in the lower pole of the right kidney, which too small to

characterize but likely represents a cyst. Adjacent cortical

defect in the lower pole of the right kidney likely reflects area

of scarring. No suspicious mass is demonstrated. There is no

abnormality in the visualized ureters.



Incidentally noted is a 7.7 x 8.4 x 10.0 cm cystic structure in

the left retroperitoneum. This demonstrates no internal

septations, appreciable enhancement or calcification.



The small bowel and colon are normal in course and caliber,

without evidence of wall thickening or obstruction. The appendix

is normal. There is colonic diverticulosis, without evidence of

acute diverticulitis.



There is no pneumoperitoneum. No lymphadenopathy is appreciated.

There is moderate calcified atherosclerotic plaque involving the

abdominal aorta, without aneurysmal dilatation. The venous

structures are not well evaluated on this exam due to poor

contrast opacification.



The abdominal wall is unremarkable. Marked multilevel

degenerative changes involve the spine. The patient is status

post L4-L5 fusion. There is mild anterolisthesis of L4 on L5.

Interbody fusion device is noted at the L4-L5 level, with some

bony fusion noted across the intervertebral disc space. No acute

osseous abnormality is demonstrated.



IMPRESSION:

Moderate focal consolidation in the left lower lobe, with

scattered areas of consolidation also noted in the right lung

base. Findings are concerning for multifocal pneumonia.



No acute abdominal or pelvic pathology is appreciated. There is

no evidence of obstruction, inflammatory process involving the

bowel, or pneumoperitoneum.



Incidentally noted is a cystic structure in the left

retroperitoneum, which measures up to 10 cm in greatest diameter.

This is of uncertain etiology or clinical significance, and the

chronicity of this is indeterminate without priors for

comparison. This is favored to represent benign etiology such as

a urinoma or lymphatic malformation, with other etiologies such

as mucinous cystadenoma or cystic teratoma less likely. Recommend

correlation with history/physical exam and any prior imaging that

may have been performed.



Other chronic and incidental findings are detailed above.



Findings are in agreement with initial teleradiology report.





Dictated by: 



  Dictated on workstation # [id number]
Medications/Labs/Imaging Studies/EKG

## 2020-10-07 ENCOUNTER — TRANSCRIPTION ENCOUNTER (OUTPATIENT)
Age: 75
End: 2020-10-07

## 2021-07-03 NOTE — PATIENT PROFILE ADULT - FUNCTIONAL SCREEN CURRENT LEVEL: DRESSING, MLM
Patient requests all Lab, Cardiology, and Radiology Results on their Discharge Instructions
0 = independent

## 2022-06-20 ENCOUNTER — NON-APPOINTMENT (OUTPATIENT)
Age: 77
End: 2022-06-20

## 2022-09-13 NOTE — ED PROVIDER NOTE - OBJECTIVE STATEMENT
74 yo male h/o cad, multiple myeloma s/p pathologic vertebral fx and fusion at [facility] c/o syncope.  Pt states he was getting his chemo injection, felt hot and dizzy and passed out.  No cp, palpitations, sob, ha, change in vision/speech, numbness or weakness; pt now feeling well.  No recent exertional cp/sob.  Pt reports remote h/o syncope w blood draw and states that he was seeing a lot of blood being drawn and infusions being started today.  Pt ate oatmeal today, no other po's.  No n/v/d.  No other complaint. never